# Patient Record
Sex: MALE | Race: WHITE | NOT HISPANIC OR LATINO | Employment: FULL TIME | URBAN - NONMETROPOLITAN AREA
[De-identification: names, ages, dates, MRNs, and addresses within clinical notes are randomized per-mention and may not be internally consistent; named-entity substitution may affect disease eponyms.]

---

## 2022-06-20 ENCOUNTER — OFFICE VISIT (OUTPATIENT)
Dept: FAMILY MEDICINE CLINIC | Facility: CLINIC | Age: 42
End: 2022-06-20

## 2022-06-20 VITALS
HEART RATE: 71 BPM | HEIGHT: 71 IN | BODY MASS INDEX: 32.9 KG/M2 | OXYGEN SATURATION: 99 % | DIASTOLIC BLOOD PRESSURE: 86 MMHG | SYSTOLIC BLOOD PRESSURE: 130 MMHG | WEIGHT: 235 LBS

## 2022-06-20 DIAGNOSIS — G89.29 CHRONIC RIGHT EAR PAIN: ICD-10-CM

## 2022-06-20 DIAGNOSIS — K21.9 GASTROESOPHAGEAL REFLUX DISEASE WITHOUT ESOPHAGITIS: Primary | ICD-10-CM

## 2022-06-20 DIAGNOSIS — H92.01 CHRONIC RIGHT EAR PAIN: ICD-10-CM

## 2022-06-20 PROCEDURE — 99213 OFFICE O/P EST LOW 20 MIN: CPT | Performed by: FAMILY MEDICINE

## 2022-06-20 RX ORDER — PANTOPRAZOLE SODIUM 40 MG/1
40 TABLET, DELAYED RELEASE ORAL DAILY
Qty: 30 TABLET | Refills: 1 | Status: SHIPPED | OUTPATIENT
Start: 2022-06-20

## 2022-06-20 NOTE — ASSESSMENT & PLAN NOTE
Both TMs showed moderate scarring.  He reports no past history of tubes.  Given his recurrent right ear pain we are going to set him up with Dr. Cabrera for further evaluation

## 2022-06-20 NOTE — ASSESSMENT & PLAN NOTE
We discussed treatment options for his reflux.  We will put him back on a course of Protonix for a full 8 weeks.  We will then transition to Pepcid as needed.  He was instructed to return for follow-up if he has any return of symptoms for further work-up and referral.

## 2022-06-20 NOTE — PROGRESS NOTES
"Chief Complaint  Stomach ulcer and right ear pain    Subjective    History of Present Illness:  Edgard Toro is a 41 y.o. male who presents today for current problems with reflux.  He had a brief trial with Protonix earlier this year but only took it as needed.  He is also having problems with chronic right ear irritation and pain.  He had work-up back in January with fasting blood work that all returned within normal limits.    He reports that his reflux bothers him mostly when he eats out at restaurants and has more to eat than usual, and also when he has excessive alcohol.  Typically drinks 1 or 2 drinks with dinner.  No tobacco use.  No problems with dysphagia or any black or bloody bowel movements.    Objective   Vital Signs:   /86 (BP Location: Right arm, Patient Position: Sitting, Cuff Size: Large Adult)   Pulse 71   Ht 180.3 cm (71\")   Wt 107 kg (235 lb)   SpO2 99%   BMI 32.78 kg/m²     Body mass index is 32.78 kg/m².    Review of Systems   Constitutional: Negative for appetite change, unexpected weight gain and unexpected weight loss.   HENT: Positive for ear pain. Negative for ear discharge.    Respiratory: Negative for cough and chest tightness.    Cardiovascular: Negative for chest pain and leg swelling.   Gastrointestinal: Positive for GERD. Negative for anal bleeding, blood in stool, nausea and vomiting.       Past History:  Medical History: has a past medical history of GERD (gastroesophageal reflux disease).   Surgical History: has a past surgical history that includes Anterior cruciate ligament repair (Right) and [place] tooth extraction.   Family History: family history includes Hypertension in his mother.   Social History: reports that he has never smoked. He has never used smokeless tobacco. He reports current alcohol use of about 10.0 standard drinks of alcohol per week. He reports that he does not use drugs.      Current Outpatient Medications:   •  pantoprazole (PROTONIX) 40 MG EC " tablet, Take 1 tablet by mouth Daily., Disp: 30 tablet, Rfl: 1    Allergies: Patient has no known allergies.    Physical Exam  Constitutional:       Appearance: Normal appearance.   HENT:      Head: Normocephalic.      Right Ear: External ear normal. Tympanic membrane is scarred.      Left Ear: External ear normal. Tympanic membrane is scarred.      Nose: Nose normal.   Eyes:      Pupils: Pupils are equal, round, and reactive to light.   Cardiovascular:      Rate and Rhythm: Normal rate and regular rhythm.   Pulmonary:      Effort: Pulmonary effort is normal.      Breath sounds: Normal breath sounds.   Musculoskeletal:         General: Normal range of motion.      Cervical back: Normal range of motion.   Neurological:      General: No focal deficit present.      Mental Status: He is alert.          Result Review                   Assessment and Plan  Diagnoses and all orders for this visit:    1. Gastroesophageal reflux disease without esophagitis (Primary)  Assessment & Plan:  We discussed treatment options for his reflux.  We will put him back on a course of Protonix for a full 8 weeks.  We will then transition to Pepcid as needed.  He was instructed to return for follow-up if he has any return of symptoms for further work-up and referral.    Orders:  -     pantoprazole (PROTONIX) 40 MG EC tablet; Take 1 tablet by mouth Daily.  Dispense: 30 tablet; Refill: 1    2. Chronic right ear pain  Assessment & Plan:  Both TMs showed moderate scarring.  He reports no past history of tubes.  Given his recurrent right ear pain we are going to set him up with Dr. Cabrera for further evaluation    Orders:  -     Ambulatory Referral to ENT (Otolaryngology)          Follow Up  Return if symptoms worsen or fail to improve.    Patient was given instructions and counseling regarding his condition or for health maintenance advice. Please see specific information pulled into the AVS if appropriate.     Agus Palafox MD

## 2023-10-23 ENCOUNTER — OFFICE VISIT (OUTPATIENT)
Dept: FAMILY MEDICINE CLINIC | Facility: CLINIC | Age: 43
End: 2023-10-23
Payer: COMMERCIAL

## 2023-10-23 VITALS
OXYGEN SATURATION: 98 % | HEIGHT: 71 IN | WEIGHT: 245.7 LBS | BODY MASS INDEX: 34.4 KG/M2 | SYSTOLIC BLOOD PRESSURE: 122 MMHG | DIASTOLIC BLOOD PRESSURE: 80 MMHG | HEART RATE: 81 BPM

## 2023-10-23 DIAGNOSIS — B35.6 TINEA CRURIS: Primary | ICD-10-CM

## 2023-10-23 PROCEDURE — 99213 OFFICE O/P EST LOW 20 MIN: CPT | Performed by: PHYSICIAN ASSISTANT

## 2023-10-23 RX ORDER — CLOTRIMAZOLE AND BETAMETHASONE DIPROPIONATE 10; .64 MG/G; MG/G
1 CREAM TOPICAL 2 TIMES DAILY
Qty: 60 G | Refills: 1 | Status: SHIPPED | OUTPATIENT
Start: 2023-10-23

## 2023-10-23 RX ORDER — KETOCONAZOLE 200 MG/1
200 TABLET ORAL DAILY
Qty: 14 TABLET | Refills: 0 | Status: SHIPPED | OUTPATIENT
Start: 2023-10-23

## 2023-10-23 NOTE — ASSESSMENT & PLAN NOTE
RX for ketoconazole tablets x 2 weeks and Lotrisone cream.  If this persists we will refer to dermatology.

## 2023-10-23 NOTE — PROGRESS NOTES
"Answers submitted by the patient for this visit:  Primary Reason for Visit (Submitted on 10/22/2023)  What is the primary reason for your visit?: Rash  Rash Questionnaire (Submitted on 10/22/2023)  Chief Complaint: Rash  Chronicity: recurrent  Onset: 1 to 4 weeks ago  Progression since onset: gradually worsening  Affected locations: groin, genitalia  Characteristics: burning, pain, redness, swelling, itchiness, peeling  Exposed to: unknown  anorexia: No  congestion: No  cough: No  diarrhea: No  eye pain: No  facial edema: No  fatigue: No  fever: No  joint pain: No  nail changes: No  rhinorrhea: No  shortness of breath: No  sore throat: No  vomiting: No  Chief Complaint  Rash (X3 wks)    Subjective          Edgard Toro presents to [facility]  Rash  Pertinent negatives include no congestion, cough, diarrhea, eye pain, fatigue, fever, rhinorrhea, shortness of breath, sore throat or vomiting.     patient is here for his groin rash or chafing.  He has tried multiple OTC remedies none of which have helped.      Objective   Vital Signs:   /80 (BP Location: Right arm)   Pulse 81   Ht 180.3 cm (71\")   Wt 111 kg (245 lb 11.2 oz)   SpO2 98%   BMI 34.27 kg/m²     Body mass index is 34.27 kg/m².    Review of Systems   Constitutional:  Negative for fatigue and fever.   HENT:  Negative for congestion, rhinorrhea and sore throat.    Eyes:  Negative for pain.   Respiratory:  Negative for cough and shortness of breath.    Gastrointestinal:  Negative for diarrhea and vomiting.   Skin:  Positive for rash.       Past History:  Medical History: has a past medical history of GERD (gastroesophageal reflux disease).   Surgical History: has a past surgical history that includes Anterior cruciate ligament repair (Right) and [place] tooth extraction.   Family History: family history includes Hypertension in his mother.   Social History: reports that he has never smoked. He has never used smokeless " tobacco. He reports current alcohol use of about 10.0 standard drinks of alcohol per week. He reports that he does not use drugs.      Current Outpatient Medications:     clotrimazole-betamethasone (Lotrisone) 1-0.05 % cream, Apply 1 application  topically to the appropriate area as directed 2 (Two) Times a Day., Disp: 60 g, Rfl: 1    ketoconazole (NIZORAL) 200 MG tablet, Take 1 tablet by mouth Daily., Disp: 14 tablet, Rfl: 0    Allergies: Patient has no known allergies.    Physical Exam  Constitutional:       Appearance: Normal appearance.   Skin:     Findings: Rash is macular and purpuric.      Comments: Macerated, in the groin   Neurological:      Mental Status: He is alert and oriented to person, place, and time.   Psychiatric:         Mood and Affect: Mood normal.         Behavior: Behavior normal.          Result Review :                   Assessment and Plan    Diagnoses and all orders for this visit:    1. Tinea cruris (Primary)  Assessment & Plan:  RX for ketoconazole tablets x 2 weeks and Lotrisone cream.  If this persists we will refer to dermatology.       Other orders  -     ketoconazole (NIZORAL) 200 MG tablet; Take 1 tablet by mouth Daily.  Dispense: 14 tablet; Refill: 0  -     clotrimazole-betamethasone (Lotrisone) 1-0.05 % cream; Apply 1 application  topically to the appropriate area as directed 2 (Two) Times a Day.  Dispense: 60 g; Refill: 1        Follow Up   Return in about 2 weeks (around 11/6/2023) for Recheck.  Patient was given instructions and counseling regarding his condition or for health maintenance advice. Please see specific information pulled into the AVS if appropriate.     Xenia Zepeda PA-C

## 2025-06-05 ENCOUNTER — OFFICE VISIT (OUTPATIENT)
Dept: FAMILY MEDICINE CLINIC | Facility: CLINIC | Age: 45
End: 2025-06-05
Payer: COMMERCIAL

## 2025-06-05 VITALS
HEIGHT: 71 IN | DIASTOLIC BLOOD PRESSURE: 70 MMHG | OXYGEN SATURATION: 97 % | WEIGHT: 218 LBS | SYSTOLIC BLOOD PRESSURE: 106 MMHG | HEART RATE: 67 BPM | BODY MASS INDEX: 30.52 KG/M2

## 2025-06-05 DIAGNOSIS — B35.3 TINEA PEDIS OF RIGHT FOOT: Primary | ICD-10-CM

## 2025-06-05 PROCEDURE — 99213 OFFICE O/P EST LOW 20 MIN: CPT | Performed by: FAMILY MEDICINE

## 2025-06-05 RX ORDER — CLOTRIMAZOLE AND BETAMETHASONE DIPROPIONATE 10; .64 MG/G; MG/G
1 CREAM TOPICAL 2 TIMES DAILY
Qty: 120 G | Refills: 0 | Status: SHIPPED | OUTPATIENT
Start: 2025-06-05

## 2025-06-05 RX ORDER — KETOCONAZOLE 200 MG/1
200 TABLET ORAL DAILY
Qty: 14 TABLET | Refills: 0 | Status: SHIPPED | OUTPATIENT
Start: 2025-06-05

## 2025-06-05 NOTE — PROGRESS NOTES
"     Follow Up Office Visit      Patient Name: Edgard Toro  : 1980   MRN: [id number]     Chief Complaint:    Chief Complaint   Patient presents with    Rash     Pt presents for a rash on both feet and ankles, starting to increase in size of spots as well as spreading up lower legs. Pt states spots are itchy not painful. Pt says he had a previous fungal infection; never finished RX. Duration: 2 months       History of Present Illness: Edgard Toro is a 44 y.o. male who is here today for follow up with recurrence of athlete's foot.  Patient had treatment with ketoconazole cream last year.  He is requesting renewal of these medications.    Subjective      Review of Systems:   Review of Systems   Skin:  Positive for rash.       The following portions of the patient's history were reviewed and updated as appropriate: allergies, current medications, past family history, past medical history, past social history, past surgical history and problem list.    Medications:     Current Outpatient Medications:     clotrimazole-betamethasone (Lotrisone) 1-0.05 % cream, Apply 1 application  topically to the appropriate area as directed 2 (Two) Times a Day., Disp: 60 g, Rfl: 1    clotrimazole-betamethasone (LOTRISONE) 1-0.05 % cream, Apply 1 Application topically to the appropriate area as directed 2 (Two) Times a Day., Disp: 120 g, Rfl: 0    ketoconazole (NIZORAL) 200 MG tablet, Take 1 tablet by mouth Daily. (Patient not taking: Reported on 2025), Disp: 14 tablet, Rfl: 0    ketoconazole (NIZORAL) 200 MG tablet, Take 1 tablet by mouth Daily., Disp: 14 tablet, Rfl: 0    Allergies:   No Known Allergies    Objective     Physical Exam:  Vital Signs:   Vitals:    25 1136   BP: 106/70   BP Location: Left arm   Patient Position: Sitting   Cuff Size: Large Adult   Pulse: 67   SpO2: 97%   Weight: 98.9 kg (218 lb)   Height: 180.3 cm (71\")     Body mass index is 30.4 kg/m².   Facility age limit for growth %rk is 20 " years.    Physical Exam  Vitals and nursing note reviewed.   Skin:     Comments: Scaling maculopapular rash on the posterior of the right foot.         Procedures    PHQ-9 Total Score:      Assessment / Plan      Assessment/Plan:   Assessment & Plan  Tinea pedis of right foot    Orders:    ketoconazole (NIZORAL) 200 MG tablet; Take 1 tablet by mouth Daily.    clotrimazole-betamethasone (LOTRISONE) 1-0.05 % cream; Apply 1 Application topically to the appropriate area as directed 2 (Two) Times a Day.           Follow-up with dermatology if symptoms persist.      Follow Up:   No follow-ups on file.      CHRISTOPHER Harris MD  [facility] Re[facility] Bal